# Patient Record
Sex: FEMALE | Race: WHITE
[De-identification: names, ages, dates, MRNs, and addresses within clinical notes are randomized per-mention and may not be internally consistent; named-entity substitution may affect disease eponyms.]

---
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
## 2024-01-13 ENCOUNTER — OFFICE VISIT (OUTPATIENT)
Dept: URGENT CARE | Facility: CLINIC | Age: 19
End: 2024-01-13
Payer: COMMERCIAL

## 2024-01-13 VITALS
DIASTOLIC BLOOD PRESSURE: 83 MMHG | BODY MASS INDEX: 29.66 KG/M2 | SYSTOLIC BLOOD PRESSURE: 122 MMHG | TEMPERATURE: 97.6 F | WEIGHT: 178 LBS | OXYGEN SATURATION: 98 % | HEIGHT: 65 IN | HEART RATE: 75 BPM

## 2024-01-13 DIAGNOSIS — J02.9 SORE THROAT: ICD-10-CM

## 2024-01-13 DIAGNOSIS — J02.9 ACUTE PHARYNGITIS, UNSPECIFIED ETIOLOGY: Primary | ICD-10-CM

## 2024-01-13 LAB — POC GROUP A STREP, PCR: DETECTED

## 2024-01-13 PROCEDURE — 99213 OFFICE O/P EST LOW 20 MIN: CPT | Mod: 25 | Performed by: NURSE PRACTITIONER

## 2024-01-13 RX ORDER — IBUPROFEN 200 MG
200 TABLET ORAL EVERY 6 HOURS PRN
COMMUNITY

## 2024-01-13 RX ORDER — NORETHINDRONE ACETATE AND ETHINYL ESTRADIOL 1; 5 MG/1; UG/1
TABLET ORAL
COMMUNITY

## 2024-01-13 RX ORDER — PREDNISONE 10 MG/1
30 TABLET ORAL DAILY
Qty: 15 TABLET | Refills: 0 | Status: SHIPPED | OUTPATIENT
Start: 2024-01-13 | End: 2024-01-13 | Stop reason: ENTERED-IN-ERROR

## 2024-01-13 RX ORDER — AZITHROMYCIN 250 MG/1
TABLET, FILM COATED ORAL
Qty: 6 TABLET | Refills: 0 | Status: SHIPPED | OUTPATIENT
Start: 2024-01-13 | End: 2024-01-18

## 2024-01-13 RX ORDER — FLUOXETINE 10 MG/1
10 CAPSULE ORAL
COMMUNITY
Start: 2023-10-12

## 2024-01-13 RX ORDER — FLUOXETINE HYDROCHLORIDE 20 MG/1
20 CAPSULE ORAL
COMMUNITY
Start: 2023-08-26

## 2024-01-13 NOTE — PROGRESS NOTES
18 y.o. female presents for evaluation of throat swelling and pain.  Pain began 2-3 days pta with associated ear discomfort and nausea.  Pain is exacerbated by oral intake.  Pt did not attempt any OTC meds or conservative measures.  No fever, chills, vomiting, URI symptoms, or other constitutional S/S.  No other complaints.         Vitals:    01/13/24 0827   BP: 122/83   Pulse: 75   Temp: 36.4 °C (97.6 °F)   SpO2: 98%       No Known Allergies    Medication Documentation Review Audit       Reviewed by Ashley Toussaint MA (Medical Assistant) on 01/13/24 at 0829      Medication Order Taking? Sig Documenting Provider Last Dose Status   FLUoxetine (PROzac) 10 mg capsule [id number] Yes Take 1 capsule (10 mg) by mouth once daily. Historical Provider, MD Taking Active   FLUoxetine (PROzac) 20 mg capsule [id number] Yes Take 1 capsule (20 mg) by mouth once daily. Historical Provider, MD Taking Active   ibuprofen 200 mg tablet [id number] Yes Take 1 tablet (200 mg) by mouth every 6 hours if needed. Historical Provider, MD Taking Active   norethindrone ac-eth estradioL (Femhrt 1/5) 1-5 mg-mcg tablet [id number] Yes Take by mouth once daily. Historical Provider, MD Taking Active                    Past Medical History:   Diagnosis Date    Depression        History reviewed. No pertinent surgical history.    ROS  See HPI    Physical Exam  Vitals and nursing note reviewed.   Constitutional:       Appearance: She is normal weight. She is ill-appearing (mildly).   HENT:      Head: Normocephalic and atraumatic.      Right Ear: Tympanic membrane and ear canal normal.      Left Ear: Tympanic membrane and ear canal normal.      Nose: Nose normal.      Mouth/Throat:      Mouth: Mucous membranes are moist.      Pharynx: Posterior oropharyngeal erythema present. No oropharyngeal exudate.      Comments: No tonsillar edema  Eyes:      Extraocular Movements: Extraocular movements intact.      Conjunctiva/sclera: Conjunctivae normal.      Pupils:  Pupils are equal, round, and reactive to light.   Cardiovascular:      Rate and Rhythm: Normal rate and regular rhythm.   Pulmonary:      Effort: Pulmonary effort is normal.      Breath sounds: Normal breath sounds.   Skin:     General: Skin is warm and dry.      Capillary Refill: Capillary refill takes less than 2 seconds.   Neurological:      General: No focal deficit present.      Mental Status: She is alert and oriented to person, place, and time.   Psychiatric:         Mood and Affect: Mood normal.         Behavior: Behavior normal.       Recent Results (from the past 1 hour(s))   POCT Group A Streptococcus, PCR manually resulted    Collection Time: 01/13/24  9:29 AM   Result Value Ref Range    POC Group A Strep, PCR Detected (A) Not Detected       Assessment/Plan/MDM  Bravo was seen today for sore throat.  Diagnoses and all orders for this visit:  Acute pharyngitis, unspecified etiology (Primary)  -     azithromycin (Zithromax Z-Yung) 250 mg tablet; Take 2 tablets (500 mg) on  Day 1,  followed by 1 tablet (250 mg) once daily on Days 2 through 5.  -     Discontinue: predniSONE (Deltasone) 10 mg tablet; Take 3 tablets (30 mg) by mouth once daily for 5 days.  Sore throat  -     Cancel: POCT UA (nonautomated w/o microscopy) manually resulted  -     POCT Group A Streptococcus, PCR manually resulted    Advised pt to change toothbrush after 3 days of antibiotics, use warm salt water gargles, otc analgesics, push by mouth fluids and rest.  Patient's clinical presentation is otherwise unremarkable at this time. Patient is discharged with instructions to follow-up with primary care or seek emergency medical attention for worsening symptoms or any new concerns.      Lul Clark, CNP  [facility]  [phone]